# Patient Record
Sex: MALE | Race: BLACK OR AFRICAN AMERICAN | NOT HISPANIC OR LATINO | URBAN - METROPOLITAN AREA
[De-identification: names, ages, dates, MRNs, and addresses within clinical notes are randomized per-mention and may not be internally consistent; named-entity substitution may affect disease eponyms.]

---

## 2019-05-28 ENCOUNTER — OFFICE VISIT (OUTPATIENT)
Dept: NEUROLOGY | Facility: CLINIC | Age: 29
End: 2019-05-28

## 2019-05-28 VITALS
OXYGEN SATURATION: 99 % | DIASTOLIC BLOOD PRESSURE: 90 MMHG | HEIGHT: 66 IN | WEIGHT: 260 LBS | BODY MASS INDEX: 41.78 KG/M2 | HEART RATE: 78 BPM | SYSTOLIC BLOOD PRESSURE: 140 MMHG

## 2019-05-28 DIAGNOSIS — G43.009 MIGRAINE WITHOUT AURA AND WITHOUT STATUS MIGRAINOSUS, NOT INTRACTABLE: Primary | ICD-10-CM

## 2019-05-28 PROCEDURE — 99244 OFF/OP CNSLTJ NEW/EST MOD 40: CPT | Performed by: PSYCHIATRY & NEUROLOGY

## 2019-05-28 RX ORDER — TOPIRAMATE 100 MG/1
100 TABLET, FILM COATED ORAL NIGHTLY
Qty: 30 TABLET | Refills: 11 | Status: SHIPPED | OUTPATIENT
Start: 2019-05-28 | End: 2019-07-30 | Stop reason: SDUPTHER

## 2019-05-28 RX ORDER — SUMATRIPTAN 100 MG/1
100 TABLET, FILM COATED ORAL
COMMUNITY
End: 2019-05-28 | Stop reason: SDUPTHER

## 2019-05-28 RX ORDER — SUMATRIPTAN 100 MG/1
100 TABLET, FILM COATED ORAL
Qty: 9 TABLET | Refills: 11 | Status: SHIPPED | OUTPATIENT
Start: 2019-05-28 | End: 2019-07-30 | Stop reason: SDUPTHER

## 2019-05-28 RX ORDER — MELATONIN
1000 DAILY
COMMUNITY

## 2019-05-28 RX ORDER — TOPIRAMATE 50 MG/1
50 TABLET, FILM COATED ORAL DAILY
COMMUNITY
End: 2019-05-28 | Stop reason: SDUPTHER

## 2019-05-28 RX ORDER — PAROXETINE HYDROCHLORIDE 20 MG/1
20 TABLET, FILM COATED ORAL EVERY MORNING
COMMUNITY

## 2019-05-28 NOTE — PROGRESS NOTES
Subjective:     Patient ID: Jose Juan Jimenez is a 29 y.o. male.    History of Present Illness    The patient is a 29-year-old right-handed young gentleman who was seen for further evaluation of headaches.  The patient was seen today in consultation per the request of Rima Gayle.  History was also taken from the patient's wife.  The patient has had headaches that are fairly frequent for about 3 months they are now every day.  They are usually over the left parietal region.  They build up as the day goes on he has been placed on Topamax 50 mg and has improved significantly he also has Imitrex 100 mg to use on a as needed basis which works quite well.  He did not take his medicine this morning.  Headaches have become more severe.  He is tried various over-the-counter medicines in the past including Excedrin, Tylenol and ibuprofen without much success there is no history of head injury.  He is never been on preventive medicine.  No neurodiagnostic studies have been done.  The following portions of the patient's history were reviewed and updated as appropriate: allergies, current medications, past family history, past medical history, past social history, past surgical history and problem list.      History reviewed. No pertinent family history.    Past Medical History:   Diagnosis Date   • Migraine        Social History     Socioeconomic History   • Marital status:      Spouse name: Not on file   • Number of children: Not on file   • Years of education: Not on file   • Highest education level: Not on file   Tobacco Use   • Smoking status: Former Smoker   Substance and Sexual Activity   • Alcohol use: Yes     Comment: weekends   • Drug use: No         Current Outpatient Medications:   •  cholecalciferol (VITAMIN D3) 1000 units tablet, Take 1,000 Units by mouth Daily., Disp: , Rfl:   •  PARoxetine (PAXIL) 20 MG tablet, Take 20 mg by mouth Every Morning., Disp: , Rfl:   •  SUMAtriptan (IMITREX) 100 MG  tablet, Take 1 tablet by mouth Every 2 (Two) Hours As Needed for Migraine. Take one tablet at onset of headache., Disp: 9 tablet, Rfl: 11  •  topiramate (TOPAMAX) 100 MG tablet, Take 1 tablet by mouth Every Night., Disp: 30 tablet, Rfl: 11    Review of Systems   Constitutional: Negative.    HENT: Negative for congestion, dental problem, drooling, ear discharge, ear pain, facial swelling, hearing loss, mouth sores, nosebleeds, postnasal drip, rhinorrhea, sinus pressure, sinus pain, sneezing, sore throat, tinnitus, trouble swallowing and voice change.    Eyes: Negative.    Respiratory: Negative.    Cardiovascular: Negative.    Gastrointestinal: Negative.    Endocrine: Negative.    Musculoskeletal: Negative.    Skin: Negative.    Allergic/Immunologic: Negative.    Neurological: Positive for dizziness and headaches. Negative for tremors, seizures, syncope, facial asymmetry, speech difficulty, weakness, light-headedness and numbness.   Hematological: Negative.    Psychiatric/Behavioral: Negative for agitation, behavioral problems, confusion, decreased concentration, dysphoric mood, hallucinations, self-injury, sleep disturbance and suicidal ideas. The patient is nervous/anxious. The patient is not hyperactive.         I have reviewed ROS completed by medical assistant.     Objective:    Neurologic Exam  General appearance is normal. Mental status showed normal orientation, memory, attention span and concentration, language, and fund of knowledge for age.    Cranial nerve exam- visual fields to OKN tape, funduscopy with examination of the optic disc and posterior segments of the eye, eye movements, pupillary reflexes, muscles of mastication, facial musculature, hearing, palatal movement, shoulder shrug and tongue protrusion were all normal.    Sensory examination was normal.  Deep tendon reflexes were 2+ and symmetric.    No pathologic reflexes were noted.  Cerebellar function was normal.  No focal weakness was noted.  No  drift of outstretched arms.  Tone was normal.  Gait and station were normal.  No edema was noted.      Physical Exam    Assessment/Plan:     Jose Juan was seen today for migraine.    Diagnoses and all orders for this visit:    Migraine without aura and without status migrainosus, not intractable    Other orders  -     topiramate (TOPAMAX) 100 MG tablet; Take 1 tablet by mouth Every Night.  -     SUMAtriptan (IMITREX) 100 MG tablet; Take 1 tablet by mouth Every 2 (Two) Hours As Needed for Migraine. Take one tablet at onset of headache.         The patient has mixed headaches.  This is migraine plus chronic daily headache.  Topamax historically has been helpful according to the patient.  Neurologic examination is normal.    Have increased topiramate to 100 mg and switched it to the bedtime hour.  Continue Imitrex but have cautioned him to take this earlier in the attack.  Follow-up in the office in 2 months.  Thank you for allowing me to share in the care of this patient.  Larry Cast M.D.

## 2019-07-30 ENCOUNTER — OFFICE VISIT (OUTPATIENT)
Dept: NEUROLOGY | Facility: CLINIC | Age: 29
End: 2019-07-30

## 2019-07-30 VITALS
WEIGHT: 266 LBS | DIASTOLIC BLOOD PRESSURE: 92 MMHG | BODY MASS INDEX: 42.75 KG/M2 | HEIGHT: 66 IN | SYSTOLIC BLOOD PRESSURE: 152 MMHG

## 2019-07-30 DIAGNOSIS — G43.009 MIGRAINE WITHOUT AURA AND WITHOUT STATUS MIGRAINOSUS, NOT INTRACTABLE: Primary | ICD-10-CM

## 2019-07-30 PROCEDURE — 99213 OFFICE O/P EST LOW 20 MIN: CPT | Performed by: PSYCHIATRY & NEUROLOGY

## 2019-07-30 RX ORDER — SUMATRIPTAN 100 MG/1
100 TABLET, FILM COATED ORAL
Qty: 9 TABLET | Refills: 11 | Status: SHIPPED | OUTPATIENT
Start: 2019-07-30

## 2019-07-30 RX ORDER — TOPIRAMATE 50 MG/1
50 TABLET, FILM COATED ORAL NIGHTLY
Qty: 30 TABLET | Refills: 11 | Status: SHIPPED | OUTPATIENT
Start: 2019-07-30

## 2019-07-30 NOTE — PROGRESS NOTES
Subjective:     Patient ID: Jose Juan Jimenez is a 29 y.o. male.    History of Present Illness  Headaches have not improved much.  History was also taken from the patient's wife.  He is currently on Topamax 100 mg at night he seems to think this may make him somewhat drowsy when taken with his other medicine.  Sumatriptan 100 mg works as expected.    The following portions of the patient's history were reviewed and updated as appropriate: allergies, current medications, past family history, past medical history, past social history, past surgical history and problem list.      Current Outpatient Medications:   •  PARoxetine (PAXIL) 20 MG tablet, Take 20 mg by mouth Every Morning., Disp: , Rfl:   •  SUMAtriptan (IMITREX) 100 MG tablet, Take 1 tablet by mouth Every 2 (Two) Hours As Needed for Migraine. Take one tablet at onset of headache., Disp: 9 tablet, Rfl: 11  •  topiramate (TOPAMAX) 50 MG tablet, Take 1 tablet by mouth Every Night., Disp: 30 tablet, Rfl: 11  •  cholecalciferol (VITAMIN D3) 1000 units tablet, Take 1,000 Units by mouth Daily., Disp: , Rfl:     Review of Systems   Constitutional: Positive for fatigue. Negative for activity change and appetite change.   HENT: Negative for ear pain, facial swelling and hearing loss.    Eyes: Negative for pain, redness and itching.   Respiratory: Negative for cough, choking and shortness of breath.    Cardiovascular: Negative for chest pain and leg swelling.   Gastrointestinal: Negative for abdominal pain, nausea and vomiting.   Endocrine: Negative for cold intolerance and heat intolerance.   Musculoskeletal: Negative for arthralgias, back pain and joint swelling.   Skin: Negative for color change, pallor and rash.   Allergic/Immunologic: Negative for environmental allergies and food allergies.   Neurological: Positive for light-headedness and headaches. Negative for dizziness, tremors, seizures, syncope, facial asymmetry, speech difficulty, weakness and numbness.    Psychiatric/Behavioral: Negative for agitation, behavioral problems, confusion, decreased concentration, dysphoric mood, hallucinations, self-injury, sleep disturbance and suicidal ideas. The patient is nervous/anxious. The patient is not hyperactive.           I have reviewed ROS completed by medical assistant.     Objective:    Neurologic Exam  Mental status examination was appropriate.  Funduscopy, visual fields, eye movements and pupillary reflexes were normal.  No facial weakness was noted.  Gait was normal.  No pattern of focal weakness was noted.  Physical Exam    Assessment/Plan:     Jose Juan was seen today for migraine.    Diagnoses and all orders for this visit:    Migraine without aura and without status migrainosus, not intractable    Other orders  -     topiramate (TOPAMAX) 50 MG tablet; Take 1 tablet by mouth Every Night.  -     SUMAtriptan (IMITREX) 100 MG tablet; Take 1 tablet by mouth Every 2 (Two) Hours As Needed for Migraine. Take one tablet at onset of headache.         Prescription drug management - meds as above    Follow-up in the office in 6 months. Thank you for allowing me to share in the care of this patient.  Larry Cast M.D.

## 2021-09-27 PROCEDURE — 99203 OFFICE O/P NEW LOW 30 MIN: CPT | Performed by: PHYSICIAN ASSISTANT
